# Patient Record
Sex: MALE | Race: WHITE | NOT HISPANIC OR LATINO | URBAN - METROPOLITAN AREA
[De-identification: names, ages, dates, MRNs, and addresses within clinical notes are randomized per-mention and may not be internally consistent; named-entity substitution may affect disease eponyms.]

---

## 2017-03-21 ENCOUNTER — INPATIENT (INPATIENT)
Facility: HOSPITAL | Age: 82
LOS: 7 days | Discharge: HOME | DRG: 640 | End: 2017-03-29
Attending: INTERNAL MEDICINE | Admitting: INTERNAL MEDICINE
Payer: MEDICARE

## 2017-03-21 VITALS — DIASTOLIC BLOOD PRESSURE: 58 MMHG | HEIGHT: 67 IN | WEIGHT: 160.06 LBS | SYSTOLIC BLOOD PRESSURE: 89 MMHG

## 2017-03-21 DIAGNOSIS — R62.7 ADULT FAILURE TO THRIVE: ICD-10-CM

## 2017-03-21 DIAGNOSIS — I48.91 UNSPECIFIED ATRIAL FIBRILLATION: ICD-10-CM

## 2017-03-21 DIAGNOSIS — E03.9 HYPOTHYROIDISM, UNSPECIFIED: ICD-10-CM

## 2017-03-21 DIAGNOSIS — N17.9 ACUTE KIDNEY FAILURE, UNSPECIFIED: ICD-10-CM

## 2017-03-21 LAB
CK SERPL-CCNC: 68 U/L — SIGNIFICANT CHANGE UP (ref 35–232)
LDH SERPL L TO P-CCNC: 180 U/L — SIGNIFICANT CHANGE UP (ref 120–225)
PHOSPHATE SERPL-MCNC: 5.7 MG/DL — HIGH (ref 2.5–4.5)
URATE SERPL-MCNC: 10.8 MG/DL — HIGH (ref 3.4–8.8)

## 2017-03-21 PROCEDURE — 99223 1ST HOSP IP/OBS HIGH 75: CPT | Mod: AI,GC

## 2017-03-21 PROCEDURE — 99285 EMERGENCY DEPT VISIT HI MDM: CPT

## 2017-03-21 RX ORDER — SODIUM CHLORIDE 9 MG/ML
1000 INJECTION, SOLUTION INTRAVENOUS
Qty: 0 | Refills: 0 | Status: DISCONTINUED | OUTPATIENT
Start: 2017-03-21 | End: 2017-03-22

## 2017-03-21 RX ORDER — LEVOTHYROXINE SODIUM 125 MCG
75 TABLET ORAL DAILY
Qty: 0 | Refills: 0 | Status: DISCONTINUED | OUTPATIENT
Start: 2017-03-21 | End: 2017-03-29

## 2017-03-21 RX ORDER — SODIUM CHLORIDE 9 MG/ML
1000 INJECTION, SOLUTION INTRAVENOUS
Qty: 0 | Refills: 0 | Status: DISCONTINUED | OUTPATIENT
Start: 2017-03-21 | End: 2017-03-21

## 2017-03-21 RX ORDER — ALLOPURINOL 300 MG
100 TABLET ORAL DAILY
Qty: 0 | Refills: 0 | Status: DISCONTINUED | OUTPATIENT
Start: 2017-03-21 | End: 2017-03-29

## 2017-03-21 RX ORDER — SODIUM CHLORIDE 9 MG/ML
1000 INJECTION INTRAMUSCULAR; INTRAVENOUS; SUBCUTANEOUS ONCE
Qty: 0 | Refills: 0 | Status: COMPLETED | OUTPATIENT
Start: 2017-03-21 | End: 2017-03-21

## 2017-03-21 RX ORDER — ENOXAPARIN SODIUM 100 MG/ML
70 INJECTION SUBCUTANEOUS DAILY
Qty: 0 | Refills: 0 | Status: DISCONTINUED | OUTPATIENT
Start: 2017-03-22 | End: 2017-03-28

## 2017-03-21 RX ORDER — APIXABAN 2.5 MG/1
2.5 TABLET, FILM COATED ORAL
Qty: 0 | Refills: 0 | Status: DISCONTINUED | OUTPATIENT
Start: 2017-03-21 | End: 2017-03-21

## 2017-03-21 RX ORDER — SODIUM CHLORIDE 9 MG/ML
1000 INJECTION INTRAMUSCULAR; INTRAVENOUS; SUBCUTANEOUS
Qty: 0 | Refills: 0 | Status: DISCONTINUED | OUTPATIENT
Start: 2017-03-21 | End: 2017-03-21

## 2017-03-21 RX ORDER — CEFTRIAXONE 500 MG/1
INJECTION, POWDER, FOR SOLUTION INTRAMUSCULAR; INTRAVENOUS
Qty: 0 | Refills: 0 | Status: DISCONTINUED | OUTPATIENT
Start: 2017-03-21 | End: 2017-03-27

## 2017-03-21 RX ORDER — GABAPENTIN 400 MG/1
0 CAPSULE ORAL
Qty: 0 | Refills: 0 | COMMUNITY

## 2017-03-21 RX ORDER — ATORVASTATIN CALCIUM 80 MG/1
20 TABLET, FILM COATED ORAL AT BEDTIME
Qty: 0 | Refills: 0 | Status: DISCONTINUED | OUTPATIENT
Start: 2017-03-21 | End: 2017-03-21

## 2017-03-21 RX ORDER — CEFTRIAXONE 500 MG/1
1 INJECTION, POWDER, FOR SOLUTION INTRAMUSCULAR; INTRAVENOUS EVERY 24 HOURS
Qty: 0 | Refills: 0 | Status: DISCONTINUED | OUTPATIENT
Start: 2017-03-22 | End: 2017-03-27

## 2017-03-21 RX ORDER — CEFTRIAXONE 500 MG/1
1 INJECTION, POWDER, FOR SOLUTION INTRAMUSCULAR; INTRAVENOUS ONCE
Qty: 0 | Refills: 0 | Status: COMPLETED | OUTPATIENT
Start: 2017-03-21 | End: 2017-03-21

## 2017-03-21 RX ADMIN — SODIUM CHLORIDE 100 MILLILITER(S): 9 INJECTION INTRAMUSCULAR; INTRAVENOUS; SUBCUTANEOUS at 19:10

## 2017-03-21 RX ADMIN — SODIUM CHLORIDE 250 MILLILITER(S): 9 INJECTION INTRAMUSCULAR; INTRAVENOUS; SUBCUTANEOUS at 15:25

## 2017-03-21 RX ADMIN — SODIUM CHLORIDE 75 MILLILITER(S): 9 INJECTION, SOLUTION INTRAVENOUS at 17:00

## 2017-03-21 RX ADMIN — SODIUM CHLORIDE 1000 MILLILITER(S): 9 INJECTION INTRAMUSCULAR; INTRAVENOUS; SUBCUTANEOUS at 17:30

## 2017-03-21 RX ADMIN — SODIUM CHLORIDE 70 MILLILITER(S): 9 INJECTION, SOLUTION INTRAVENOUS at 23:35

## 2017-03-21 RX ADMIN — SODIUM CHLORIDE 1000 MILLILITER(S): 9 INJECTION INTRAMUSCULAR; INTRAVENOUS; SUBCUTANEOUS at 13:44

## 2017-03-21 RX ADMIN — SODIUM CHLORIDE 100 MILLILITER(S): 9 INJECTION, SOLUTION INTRAVENOUS at 23:47

## 2017-03-21 NOTE — H&P ADULT. - NEGATIVE NEUROLOGICAL SYMPTOMS
no vertigo/no loss of sensation/no generalized seizures/no syncope/no tremors/no transient paralysis/no paresthesias/no focal seizures

## 2017-03-21 NOTE — H&P ADULT. - PROBLEM SELECTOR PLAN 1
Pt is not eating and drinking and became non verbal, low albumin 2.5 likely due to Failure to thrive and AMS from dehydration.  Pt has leukocytosis 39.6 with lymphocyte predominance with smudge cells, rouleaux formation, and giant platelets on peripheral smear concerning for Chronic Lymphocytic Leukemia or other Malignancy which could also lead to FTT.  S/P ns 2 LT BOLUS, will give D5W and 0.45% NS 2 60 as hypernatremia 156.  f/u speech and swallow eval  f/u Ca 19-9, CEA and Anemia panel Pt is not eating and drinking and became non verbal, low albumin 2.5 likely due to Failure to thrive and AMS from dehydration.  Pt has leukocytosis 39.6 with lymphocyte predominance with smudge cells, rouleaux formation, and giant platelets on peripheral smear concerning for Chronic Lymphocytic Leukemia or other Malignancy which could also lead to FTT.  Pt has acidosis with bicarb 18, likely metabolic acidosis from dehydration, will f/u lactate and ABG  S/P ns 2 LT BOLUS, will give D5W and 0.45% NS 2 60 as hypernatremia 156.  f/u speech and swallow eval  f/u Ca 19-9, CEA and Anemia panel Pt is not eating and drinking and became non verbal, low albumin 2.5 likely due to Failure to thrive and AMS from dehydration.  Pt has leukocytosis 39.6 with lymphocyte predominance with smudge cells, rouleaux formation, and giant platelets on peripheral smear concerning for Chronic Lymphocytic Leukemia  with concern for tumor lysis syndrome given NICOLE which could lead to FTT.  Pt has acidosis with bicarb 18, likely metabolic acidosis from dehydration, will f/u lactate and ABG  S/P ns 2 LT BOLUS, will give D5W and 0.45% NS 2 60 as hypernatremia 156.  f/u speech and swallow eval  f/u Ca 19-9, and Anemia panel  Hem onco Dr. Schuster consulted

## 2017-03-21 NOTE — H&P ADULT. - PROBLEM SELECTOR PLAN 4
continue eliquis, rate controlled held eliquis as pt eGFR is 15, will give renal adjusted Lovenox 70mg daily.

## 2017-03-21 NOTE — H&P ADULT. - PROBLEM SELECTOR PLAN 2
Pt has NICOLE with creatinine 3.13, likely prerenal or post renal due to dehydration or obstruction.  f/u urine lytes  f/u CT abd/ pelvis to r/o obstruction and malignancy  monitor bmp Pt has NICOLE with creatinine 3.13, likely prerenal or post renal due to dehydration or concern for tumor lysis syndrome from possible CLL.  f/u urine lytes  f/u CT abd/ pelvis to r/o  malignancy  monitor bmp  f/u uric acid and CK to look for tumor lysis  Called PMD Dr. Casper Wood [phone] and Nephro [phone] for baseline creatinine and any antibiotic prescribed for strep throat, but no answer. Primary team to call in AM again Pt has NICOLE with creatinine 3.13, unknown baseline, no known CKD likely prerenal or post renal due to dehydration or concern for tumor lysis syndrome from possible CLL.  f/u urine lytes  f/u CT abd/ pelvis to r/o  malignancy  monitor bmp  f/u uric acid and CK to look for tumor lysis  Called PMD Dr. Casper Wood [phone] and Nephro [phone] for baseline creatinine and any antibiotic prescribed for strep throat, but no answer. Primary team to call in AM again

## 2017-03-21 NOTE — H&P ADULT. - RS GEN PE MLT RESP DETAILS PC
no rhonchi/no rales/no wheezes/no chest wall tenderness/breath sounds equal/clear to auscultation bilaterally/normal

## 2017-03-21 NOTE — H&P ADULT. - NEUROLOGICAL DETAILS
normal strength/sensation intact/cranial nerves intact/ALERT AND AWAKE, pt not follwing commands and not  answering questions

## 2017-03-21 NOTE — H&P ADULT. - ATTENDING COMMENTS
Patient seen and examined after d/w PGY2 MAR Dr. Arcos    89 y/o male from home, walks with walker at baseline, lives with wife and has HHA, with PMHx of Aortic Valve Replacement (Surgery done 9 months ago) and Hypothyroidism, ?Afib as on eliquis, PSH of Rt knee replacement, presents to the ED BIB son for failure to thrive. As per son, for the past 2 weeks pt has stopped eating, taking his medications, spitting out water, refusing food, not walking, and has become nonverbal, was AAOX3 and verbal at baseline. Pt is currently on Eliquis and Gabapentin. ROS limited given that pt is nonverbal. Son denies recent falls, fevers, and chills, SOB, CP, Abd pain, dysuria, n/v/d.  In ED pt has WBC of 39.6 with lymphocyte predominance, bp in 90s, sodium 156, bicarb 18.    At present HHA at bedside; Patient is drowsy and appears very dry but arousable, non verbal;     P/E:: Limited exam; as per PGY2    Labs: reviewed; Na !56, CO2 18, Cr 3.13  WBC 39,000  CXR: No active chest disease    D/D:  Acute Hypernatremia with Acute Kidney injury likely due to Chronic Lymphocytic leukemia with dehydration  Likely Tumor lysis syndrome    Plan:  Admit to medicine; IV Boluses; Maintenance IV fluid Normal saline for 24 hrs  Repeat BMP; Monitor Na closely.  Oncology OT eval complete. Dr. Randall Schuster; Agrees with supportive care, hydration and starting Allopurinol  Discontinue Apixaban due to GFR less than 30; Start Lovenox therapeutic once daily tomorrow adjusted for Ce Cl.  Called contact info home granddaughter picked up not aware of pt hospitalization; gave Nagi grandson number [phone]  Needs to address Goals of care; PGY 2 MAR d/w son who is going to discuss with family. DNR would be appropriate  Monitor BMP closely Patient seen and examined after d/w PGY2 MAR Dr. Arcos    89 y/o male from home, walks with walker at baseline, lives with wife and has HHA, with PMHx of Aortic Valve Replacement (Surgery done 9 months ago) and Hypothyroidism, ?Afib as on eliquis, PSH of Rt knee replacement, presents to the ED BIB son for failure to thrive. As per son, for the past 2 weeks pt has stopped eating, taking his medications, spitting out water, refusing food, not walking, and has become nonverbal, was AAOX3 and verbal at baseline. Pt is currently on Eliquis and Gabapentin. ROS limited given that pt is nonverbal. Son denies recent falls, fevers, and chills, SOB, CP, Abd pain, dysuria, n/v/d.  In ED pt has WBC of 39.6 with lymphocyte predominance, bp in 90s, sodium 156, bicarb 18.    At present HHA at bedside; Patient is drowsy and appears very dry but arousable, non verbal;     P/E:: Limited exam; as per PGY2    Labs: reviewed; Na !56, CO2 18, Cr 3.13  WBC 39,000  CXR: No active chest disease    D/D:  Acute Hypernatremia with Acute Kidney injury likely due to Chronic Lymphocytic leukemia with dehydration  Likely Tumor lysis syndrome    Plan:  Admit to medicine; IV Boluses; Maintenance IV fluid Normal saline for 24 hrs  Repeat BMP; Monitor Na closely.  Oncology OT eval complete. Dr. Randall Schuster; Agrees with supportive care, hydration and starting Allopurinol  d/w Renal consult Dr. solis/ Dr. Marquez recommend one dose Rasburicase   Discontinue Apixaban due to GFR less than 30; Start Lovenox therapeutic once daily tomorrow adjusted for Ce Cl.  Called contact info home granddaughter picked up not aware of pt hospitalization; gave Nagi grandson number [phone]  Needs to address Goals of care; PGY 2 MAR d/w son who is going to discuss with family. DNR would be appropriate  Monitor BMP closely Patient seen and examined after d/w PGY2 MAR Dr. Arcos    89 y/o male from home, walks with walker at baseline, lives with wife and has HHA, with PMHx of Aortic Valve Replacement (Surgery done 9 months ago) and Hypothyroidism, ?Afib as on eliquis, PSH of Rt knee replacement, presents to the ED BIB son for failure to thrive. As per son, for the past 2 weeks pt has stopped eating, taking his medications, spitting out water, refusing food, not walking, and has become nonverbal, was AAOX3 and verbal at baseline. Pt is currently on Eliquis and Gabapentin. ROS limited given that pt is nonverbal. Son denies recent falls, fevers, and chills, SOB, CP, Abd pain, dysuria, n/v/d.  In ED pt has WBC of 39.6 with lymphocyte predominance, bp in 90s, sodium 156, bicarb 18.    At present HHA at bedside; Patient is drowsy and appears very dry but arousable, non verbal;     P/E:: Limited exam; as per PGY2    Labs: reviewed; Na !56, CO2 18, Cr 3.13  WBC 39,000  CXR: No active chest disease    D/D:  Acute Hypernatremia with Acute Kidney injury likely due to Chronic Lymphocytic leukemia with dehydration  Likely Tumor lysis syndrome    Plan:  Admit to medicine; IV Boluses; Maintenance IV fluid Normal saline for 24 hrs then change to D5 water  Repeat BMP; Monitor Na closely.  Oncology OT eval complete. Dr. Randall Schuster; Agrees with supportive care, hydration and starting Allopurinol  d/w Renal consult Dr. solis/ Dr. Marquez recommend one dose Rasburicase   Discontinue Apixaban due to GFR less than 30; Start Lovenox therapeutic once daily tomorrow adjusted for Ce Cl.  Called contact info home granddaughter picked up not aware of pt hospitalization; gave Nagi grandson number [phone]  Needs to address Goals of care; PGY 2 MAR d/w son who is going to discuss with family. DNR would be appropriate  Monitor BMP closely Patient seen and examined after d/w PGY2 MAR Dr. Arcos    89 y/o male from home, walks with walker at baseline, lives with wife and has HHA, with PMHx of Aortic Valve Replacement (Surgery done 9 months ago) and Hypothyroidism, ?Afib as on eliquis, PSH of Rt knee replacement, presents to the ED BIB son for failure to thrive. As per son, for the past 2 weeks pt has stopped eating, taking his medications, spitting out water, refusing food, not walking, and has become nonverbal, was AAOX3 and verbal at baseline. Pt is currently on Eliquis and Gabapentin. ROS limited given that pt is nonverbal. Son denies recent falls, fevers, and chills, SOB, CP, Abd pain, dysuria, n/v/d.  In ED pt has WBC of 39.6 with lymphocyte predominance, bp in 90s, sodium 156, bicarb 18.    At present HHA at bedside; Patient is drowsy and appears very dry but arousable, non verbal;     P/E:: Limited exam; as per PGY2    Labs: reviewed; Na !56, CO2 18, Cr 3.13  WBC 39,000  CXR: No active chest disease    D/D:  Acute Hypernatremia with Acute Kidney injury likely due to Chronic Lymphocytic leukemia with dehydration  Likely Tumor lysis syndrome    Plan:  Admit to medicine; IV Boluses; Maintenance IV fluid Normal saline for 24 hrs then change to D5 water  Repeat BMP; Monitor Na closely.  Oncology OT eval complete. Dr. Randall Schuster; Agrees with supportive care, hydration and starting Allopurinol  d/w Renal consult Dr. Rodrigues/ Dr. Marquez recommend Rasburicase if Allopurinol does not help  Discontinue Apixaban due to GFR less than 30; Start Lovenox therapeutic once daily tomorrow adjusted for Ce Cl.  Called contact info home granddaughter picked up not aware of pt hospitalization; gave Nagi grandson number [phone]  Needs to address Goals of care; PGY 2 MAR d/w son who is going to discuss with family. DNR would be appropriate  Monitor BMP closely; d/w PGY2 MAR and PGY3 on call Dr. Gonzalez; CK levels in AM  If renal functions does not improve will consider Renal sonogram

## 2017-03-21 NOTE — H&P ADULT. - NEGATIVE CARDIOVASCULAR SYMPTOMS
no paroxysmal nocturnal dyspnea/no dyspnea on exertion/no palpitations/no chest pain/no peripheral edema

## 2017-03-21 NOTE — ED PROVIDER NOTE - MEDICAL DECISION MAKING DETAILS
Pt with failure to thrive and dehydration x 2 weeks. Will check labs to r/o sepsis, give IVF, and get CT head to r/o ICH or any acute pathologies. Pt with failure to thrive and dehydration x 2 weeks. Will check labs to r/o sepsis, electrolyte abnormality. Will give IVF, and get CT head to r/o ICH or any acute pathology, will plan to admit given patient has change in activities of daily living.

## 2017-03-21 NOTE — H&P ADULT. - ASSESSMENT
89 y/o male from home, walks with walker at baseline, lives with wife and has HHA, with PMHx of Aortic Valve Replacement (Surgery done 9 months ago) and Hypothyroidism, ?Afib as on eliquis, PSH of Rt knee replacement, presents to the ED BIB son for failure to thrive. As per son, for the past 2 weeks pt has stopped eating, taking his medications, spitting out water, refusing food, not walking, and has become nonverbal, was AAOX3 and verbal at baseline. Pt is currently on Eliquis and Gabapentin. ROS limited given that pt is nonverbal. Son denies recent falls, fevers, and chills, SOB, CP, Abd pain, dysuria, n/v/d.  In ED pt has WBC of 39.6 with lymphocyte predominance, bp in 90s, sodium 156, bicarb 18.  Pt s son thinks he should be comfortable but wants to discuss with pt s wife and daughter before making a decision about goals of care. 89 y/o male from home, walks with walker at baseline, lives with wife and has HHA, with PMHx of Aortic Valve Replacement (Surgery done 9 months ago) and Hypothyroidism, ?Afib as on eliquis, PSH of Rt knee replacement, presents to the ED BIB son for failure to thrive. As per son, for the past 2 weeks pt has stopped eating, taking his medications, spitting out water, refusing food, not walking, and has become nonverbal, was AAOX3 and verbal at baseline. Pt is currently on Eliquis and Gabapentin. ROS limited given that pt is nonverbal. Son denies recent falls, fevers, and chills, SOB, CP, Abd pain, dysuria, n/v/d.  In ED pt has WBC of 39.6 with lymphocyte predominance, bp in 90s, sodium 156, bicarb 18.  Pt s son thinks he should be comfortable but wants to discuss with pt s wife and daughter before making a decision about goals of care.  Called PMD Dr. Casper Wood [phone] and Nephro [phone] for baseline creatinine and any antibiotic prescribed for strep throat, but no answer. Primary team to call in AM again. 89 y/o male from home, walks with walker at baseline, lives with wife and has HHA, with PMHx of Aortic Valve Replacement (Surgery done 9 months ago) and Hypothyroidism, ?Afib as on eliquis, PSH of Rt knee replacement, presents to the ED BIB son for failure to thrive. As per son, for the past 2 weeks pt has stopped eating, taking his medications, spitting out water, refusing food, not walking, and has become nonverbal, was AAOX3 and verbal at baseline. Pt is currently on Eliquis and Gabapentin. ROS limited given that pt is nonverbal. Son denies recent falls, fevers, and chills, SOB, CP, Abd pain, dysuria, n/v/d.  In ED pt has WBC of 39.6 with lymphocyte predominance, bp in 90s, sodium 156, bicarb 18.  Pt s son thinks he should be comfortable but wants to discuss with pt s wife and daughter before making a decision about goals of care.

## 2017-03-21 NOTE — ED PROVIDER NOTE - NS ED MD SCRIBE ATTENDING SCRIBE SECTIONS
PAST MEDICAL/SURGICAL/SOCIAL HISTORY/VITAL SIGNS( Pullset)/DISPOSITION/HISTORY OF PRESENT ILLNESS/HIV/PHYSICAL EXAM/REVIEW OF SYSTEMS

## 2017-03-21 NOTE — ED PROVIDER NOTE - OBJECTIVE STATEMENT
89 y/o male with PMHx of Aortic Valve Replacement (Surgery done 9 months ago) and Hypothyroidism presents to the ED BIB son for failure to thrive. As per son and aide, for the past 2 weeks pt has stopped eating, taking his medications, spitting out water, refusing food, not walking, and has become nonverbal. Pt is currently on Eliquis and Gabapentin. ROS limited given that pt is nonverbal. Son denies recent falls, fevers, and chills. PMD: Dr. Luis.

## 2017-03-21 NOTE — ED PROVIDER NOTE - CARE PLAN
Principal Discharge DX:	Failure to thrive in adult Principal Discharge DX:	Failure to thrive in adult  Secondary Diagnosis:	Acute renal failure, unspecified acute renal failure type

## 2017-03-21 NOTE — H&P ADULT. - HISTORY OF PRESENT ILLNESS
89 y/o male from home, walks with walker at baseline, lives with wife and has HHA, with PMHx of Aortic Valve Replacement (Surgery done 9 months ago) and Hypothyroidism, ?Afib as on eliquis, PSH of Rt knee replacement, presents to the ED BIB son for failure to thrive. As per son, for the past 2 weeks pt has stopped eating, taking his medications, spitting out water, refusing food, not walking, and has become nonverbal, was AAOX3 and verbal at baseline. Pt is currently on Eliquis and Gabapentin. ROS limited given that pt is nonverbal. Son denies recent falls, fevers, and chills, SOB, CP, Abd pain, dysuria, n/v/d.  In ED pt has WBC of 39.6 with lymphocyte predominance, bp in 90s, sodium 156, bicarb 18.  Pt s son thinks he should be comfortable but wants to discuss with pt s wife and daughter before making a decision about goals of care. 89 y/o male from home, walks with walker at baseline, lives with wife and has HHA, with PMHx of Aortic Valve Replacement (Surgery done 9 months ago) and Hypothyroidism, ?Afib as on eliquis, PSH of Rt knee replacement, presents to the ED BIB son for failure to thrive. As per son, for the past 2 weeks pt has stopped eating, taking his medications, spitting out water, refusing food, not walking, and has become nonverbal, was AAOX3 and verbal at baseline. Pt is currently on Eliquis and Gabapentin. ROS limited given that pt is nonverbal. Son denies recent falls, fevers, and chills, SOB, CP, Abd pain, dysuria, n/v/d.  In ED pt has WBC of 39.6 with lymphocyte predominance, bp in 90s, sodium 156, bicarb 18.  Pt s son thinks he should be comfortable but wants to discuss with pt s wife and daughter before making a decision about goals of care.  Called PMD Dr. Casper Wood [phone] and Nephro [phone] for baseline creatinine and any antibiotic prescribed for strep throat, but no answer. Primary team to call in AM again.

## 2017-03-21 NOTE — ED ADULT NURSE NOTE - OBJECTIVE STATEMENT
Patient came to the ED sent from home for failure to thrive. Patient is not eating or drinking properly for 2 weeks.

## 2017-03-22 PROCEDURE — 71010: CPT | Mod: 26

## 2017-03-22 PROCEDURE — 99232 SBSQ HOSP IP/OBS MODERATE 35: CPT | Mod: GC

## 2017-03-22 RX ORDER — SODIUM CHLORIDE 9 MG/ML
1000 INJECTION, SOLUTION INTRAVENOUS
Qty: 0 | Refills: 0 | Status: DISCONTINUED | OUTPATIENT
Start: 2017-03-22 | End: 2017-03-29

## 2017-03-22 RX ADMIN — ENOXAPARIN SODIUM 70 MILLIGRAM(S): 100 INJECTION SUBCUTANEOUS at 13:45

## 2017-03-22 RX ADMIN — CEFTRIAXONE 100 GRAM(S): 500 INJECTION, POWDER, FOR SOLUTION INTRAMUSCULAR; INTRAVENOUS at 00:55

## 2017-03-22 RX ADMIN — CEFTRIAXONE 100 GRAM(S): 500 INJECTION, POWDER, FOR SOLUTION INTRAMUSCULAR; INTRAVENOUS at 23:49

## 2017-03-22 RX ADMIN — Medication 75 MICROGRAM(S): at 07:02

## 2017-03-22 RX ADMIN — SODIUM CHLORIDE 70 MILLILITER(S): 9 INJECTION, SOLUTION INTRAVENOUS at 23:50

## 2017-03-22 NOTE — DIETITIAN INITIAL EVALUATION ADULT. - MD RECOMMEND
po supplement/Advance diet with nutrition supplement pending Speech & Swallow evaluation, as medically feasible

## 2017-03-22 NOTE — DIETITIAN INITIAL EVALUATION ADULT. - NUTRITION INTERVENTION
Meals and Snack/Feeding Assistance/Collaboration and Referral of Nutrition Care/Medical Food Supplements

## 2017-03-22 NOTE — DIETITIAN INITIAL EVALUATION ADULT. - OTHER INFO
Nutrition consult requested for failure to thrive. Patient from home lives with wife & has HHA. Pt. visited, alert but confused, poor historian, tried contacting son but no response, per H&P/son reports pt. 2xwks stopped eating, spitting water & refusing to eat. Also unable to obtain wt. hx. Per RN pt. NPO presently pending Speech & Swallow evaluation & awaiting to advance diet pending decision, Renal consult noted, skin intact.

## 2017-03-22 NOTE — SWALLOW BEDSIDE ASSESSMENT ADULT - COMMENTS
HOB elevated to 45 degrees. Pt awake, evidenced by moving of arms, but eyes remained closed for the entirety of eval. Nonverbal and unresponsive to sternal rubs, verbal cueing, or repositioning. Extensive tactile cueing ineffective in yielding PO acceptance; exam was subsequently terminated due to pt's oral defensiveness and refusal to take PO trials.

## 2017-03-23 LAB
ANION GAP SERPL CALC-SCNC: 9 MMOL/L — SIGNIFICANT CHANGE UP (ref 5–17)
BUN SERPL-MCNC: 51 MG/DL — HIGH (ref 7–18)
CALCIUM SERPL-MCNC: 8.2 MG/DL — LOW (ref 8.4–10.5)
CHLORIDE SERPL-SCNC: 124 MMOL/L — HIGH (ref 96–108)
CO2 SERPL-SCNC: 20 MMOL/L — LOW (ref 22–31)
CREAT SERPL-MCNC: 1.94 MG/DL — HIGH (ref 0.5–1.3)
GLUCOSE SERPL-MCNC: 120 MG/DL — HIGH (ref 70–99)
POTASSIUM SERPL-MCNC: 4.1 MMOL/L — SIGNIFICANT CHANGE UP (ref 3.5–5.3)
POTASSIUM SERPL-SCNC: 4.1 MMOL/L — SIGNIFICANT CHANGE UP (ref 3.5–5.3)
SODIUM SERPL-SCNC: 153 MMOL/L — HIGH (ref 135–145)
URATE SERPL-MCNC: 9.5 MG/DL — HIGH (ref 3.4–8.8)

## 2017-03-23 PROCEDURE — 99232 SBSQ HOSP IP/OBS MODERATE 35: CPT | Mod: GC

## 2017-03-23 RX ORDER — SENNA PLUS 8.6 MG/1
2 TABLET ORAL AT BEDTIME
Qty: 0 | Refills: 0 | Status: DISCONTINUED | OUTPATIENT
Start: 2017-03-23 | End: 2017-03-29

## 2017-03-23 RX ORDER — DOCUSATE SODIUM 100 MG
100 CAPSULE ORAL DAILY
Qty: 0 | Refills: 0 | Status: DISCONTINUED | OUTPATIENT
Start: 2017-03-23 | End: 2017-03-27

## 2017-03-23 RX ORDER — POLYETHYLENE GLYCOL 3350 17 G/17G
17 POWDER, FOR SOLUTION ORAL DAILY
Qty: 0 | Refills: 0 | Status: COMPLETED | OUTPATIENT
Start: 2017-03-23 | End: 2017-03-26

## 2017-03-23 RX ADMIN — SODIUM CHLORIDE 70 MILLILITER(S): 9 INJECTION, SOLUTION INTRAVENOUS at 08:26

## 2017-03-23 RX ADMIN — SODIUM CHLORIDE 70 MILLILITER(S): 9 INJECTION, SOLUTION INTRAVENOUS at 06:34

## 2017-03-23 RX ADMIN — ENOXAPARIN SODIUM 70 MILLIGRAM(S): 100 INJECTION SUBCUTANEOUS at 17:27

## 2017-03-23 RX ADMIN — Medication 100 MILLIGRAM(S): at 17:28

## 2017-03-23 NOTE — SWALLOW BEDSIDE ASSESSMENT ADULT - ASR SWALLOW LABIAL MOBILITY
unable to assess due to poor pt participation
small oral aperture, tight pucker/impaired coordination

## 2017-03-23 NOTE — SWALLOW BEDSIDE ASSESSMENT ADULT - COMMENTS
Pt seen for bedside swallow evaluation, Alert & coopertive, nonverbal, family present (1 whom spoke some English). HOB elevated to 90 degrees. Attempted to obtain Tajik phone , but waited unsuccessfully >10 mins. Notably, during exam, Pt coughed up 2 whole grapes, which were fed to him by family members prior to this exam. Family was educated at bedside on aspiration risks. No overt s/s aspiration on trials.

## 2017-03-23 NOTE — SWALLOW BEDSIDE ASSESSMENT ADULT - SWALLOW EVAL: RECOMMENDED FEEDING/EATING TECHNIQUES
maintain upright posture during/after eating for 30 mins/oral hygiene/position upright (90 degrees)
allow for swallow between intakes/position upright (90 degrees)/maintain upright posture during/after eating for 30 mins/check mouth frequently for oral residue/pocketing/crush medication (when feasible)/alternate food with liquid/oral hygiene/small sips/bites

## 2017-03-23 NOTE — SWALLOW BEDSIDE ASSESSMENT ADULT - ASR SWALLOW REFERRAL
High risk for malnutrition; suspected psychogenic dysphagia/Registered Dietitian/psychology
High risk for malnutrition; suspected psychogenic dysphagia/Registered Dietitian

## 2017-03-23 NOTE — SWALLOW BEDSIDE ASSESSMENT ADULT - SWALLOW EVAL: RECOMMENDED DIET
Will defer PO diet to medical team at this time; unable to make diet recommendations at this time 2/2 pt refusal to take sufficient PO trials for swallow evaluation.
Puree with thin liquids

## 2017-03-23 NOTE — SWALLOW BEDSIDE ASSESSMENT ADULT - SLP PERTINENT HISTORY OF CURRENT PROBLEM
89 y/o male from home, with PMHx of Aortic Valve Replacement s/p 9 months, Hypothyroidism, ?Afib, PSH of Rt knee replacement, brought in originally for failure to thrive.
89 y/o male from home, walks with walker at baseline, lives with wife and has HHA, with PMHx of Aortic Valve Replacement s/p 9 months, Hypothyroidism, ?Afib, PSH of Rt knee replacement, presented to the ED BIB son for failure to thrive. As per son in ED, pt has stopped eating & taking medications, spits out water, refuses food, stopped walking, and has become nonverbal. Son states that pt was AAOX3 and verbal at baseline.

## 2017-03-23 NOTE — SWALLOW BEDSIDE ASSESSMENT ADULT - SWALLOW EVAL: DIAGNOSIS
Pt p/w with suspected psychogenic dysphagia, c/b oral defensiveness, refusal to accept PO trials, and agitation (clenching lips, turning head away) in response to PO presentation.
Pt p/w s&s oropharyngeal dysphagia, impaired bolus formation, slow oral transit, and delayed swallow reflex. No overt s/s aspiration on trials.

## 2017-03-24 LAB
ALBUMIN SERPL ELPH-MCNC: 2.3 G/DL — LOW (ref 3.5–5)
ALP SERPL-CCNC: 107 U/L — SIGNIFICANT CHANGE UP (ref 40–120)
ALT FLD-CCNC: 30 U/L DA — SIGNIFICANT CHANGE UP (ref 10–60)
ANION GAP SERPL CALC-SCNC: 8 MMOL/L — SIGNIFICANT CHANGE UP (ref 5–17)
ANION GAP SERPL CALC-SCNC: 9 MMOL/L — SIGNIFICANT CHANGE UP (ref 5–17)
AST SERPL-CCNC: 24 U/L — SIGNIFICANT CHANGE UP (ref 10–40)
BILIRUB SERPL-MCNC: 0.5 MG/DL — SIGNIFICANT CHANGE UP (ref 0.2–1.2)
BUN SERPL-MCNC: 45 MG/DL — HIGH (ref 7–18)
BUN SERPL-MCNC: 45 MG/DL — HIGH (ref 7–18)
C3 SERPL-MCNC: 107 MG/DL — SIGNIFICANT CHANGE UP (ref 80–180)
C4 SERPL-MCNC: 45 MG/DL — SIGNIFICANT CHANGE UP (ref 10–45)
CALCIUM SERPL-MCNC: 8.3 MG/DL — LOW (ref 8.4–10.5)
CALCIUM SERPL-MCNC: 8.4 MG/DL — SIGNIFICANT CHANGE UP (ref 8.4–10.5)
CHLORIDE SERPL-SCNC: 120 MMOL/L — HIGH (ref 96–108)
CHLORIDE SERPL-SCNC: 123 MMOL/L — HIGH (ref 96–108)
CO2 SERPL-SCNC: 21 MMOL/L — LOW (ref 22–31)
CO2 SERPL-SCNC: 21 MMOL/L — LOW (ref 22–31)
CREAT SERPL-MCNC: 1.86 MG/DL — HIGH (ref 0.5–1.3)
CREAT SERPL-MCNC: 2.01 MG/DL — HIGH (ref 0.5–1.3)
GLUCOSE SERPL-MCNC: 111 MG/DL — HIGH (ref 70–99)
GLUCOSE SERPL-MCNC: 145 MG/DL — HIGH (ref 70–99)
HCT VFR BLD CALC: 32.9 % — LOW (ref 39–50)
HGB BLD-MCNC: 10.3 G/DL — LOW (ref 13–17)
MAGNESIUM SERPL-MCNC: 2.2 MG/DL — SIGNIFICANT CHANGE UP (ref 1.8–2.4)
MCHC RBC-ENTMCNC: 26.7 PG — LOW (ref 27–34)
MCHC RBC-ENTMCNC: 31.4 GM/DL — LOW (ref 32–36)
MCV RBC AUTO: 85.2 FL — SIGNIFICANT CHANGE UP (ref 80–100)
PHOSPHATE SERPL-MCNC: 2.8 MG/DL — SIGNIFICANT CHANGE UP (ref 2.5–4.5)
PLATELET # BLD AUTO: 265 K/UL — SIGNIFICANT CHANGE UP (ref 150–400)
POTASSIUM SERPL-MCNC: 4.1 MMOL/L — SIGNIFICANT CHANGE UP (ref 3.5–5.3)
POTASSIUM SERPL-MCNC: 4.3 MMOL/L — SIGNIFICANT CHANGE UP (ref 3.5–5.3)
POTASSIUM SERPL-SCNC: 4.1 MMOL/L — SIGNIFICANT CHANGE UP (ref 3.5–5.3)
POTASSIUM SERPL-SCNC: 4.3 MMOL/L — SIGNIFICANT CHANGE UP (ref 3.5–5.3)
PROT SERPL-MCNC: 6.8 G/DL — SIGNIFICANT CHANGE UP (ref 6–8.3)
RBC # BLD: 3.86 M/UL — LOW (ref 4.2–5.8)
RBC # FLD: 15.7 % — HIGH (ref 10.3–14.5)
SODIUM SERPL-SCNC: 150 MMOL/L — HIGH (ref 135–145)
SODIUM SERPL-SCNC: 152 MMOL/L — HIGH (ref 135–145)
URATE SERPL-MCNC: 8.4 MG/DL — SIGNIFICANT CHANGE UP (ref 3.4–8.8)
WBC # BLD: SIGNIFICANT CHANGE UP K/UL (ref 3.8–10.5)
WBC # FLD AUTO: SIGNIFICANT CHANGE UP K/UL (ref 3.8–10.5)

## 2017-03-24 PROCEDURE — 99232 SBSQ HOSP IP/OBS MODERATE 35: CPT | Mod: GC

## 2017-03-24 RX ADMIN — ENOXAPARIN SODIUM 70 MILLIGRAM(S): 100 INJECTION SUBCUTANEOUS at 13:17

## 2017-03-24 RX ADMIN — CEFTRIAXONE 100 GRAM(S): 500 INJECTION, POWDER, FOR SOLUTION INTRAMUSCULAR; INTRAVENOUS at 00:24

## 2017-03-24 RX ADMIN — Medication 100 MILLIGRAM(S): at 13:17

## 2017-03-24 RX ADMIN — POLYETHYLENE GLYCOL 3350 17 GRAM(S): 17 POWDER, FOR SOLUTION ORAL at 13:18

## 2017-03-24 RX ADMIN — Medication 75 MICROGRAM(S): at 05:16

## 2017-03-24 RX ADMIN — CEFTRIAXONE 100 GRAM(S): 500 INJECTION, POWDER, FOR SOLUTION INTRAMUSCULAR; INTRAVENOUS at 23:25

## 2017-03-24 RX ADMIN — SODIUM CHLORIDE 70 MILLILITER(S): 9 INJECTION, SOLUTION INTRAVENOUS at 00:26

## 2017-03-25 LAB
ANION GAP SERPL CALC-SCNC: 7 MMOL/L — SIGNIFICANT CHANGE UP (ref 5–17)
ANION GAP SERPL CALC-SCNC: 8 MMOL/L — SIGNIFICANT CHANGE UP (ref 5–17)
BUN SERPL-MCNC: 36 MG/DL — HIGH (ref 7–18)
BUN SERPL-MCNC: 39 MG/DL — HIGH (ref 7–18)
CALCIUM SERPL-MCNC: 8.1 MG/DL — LOW (ref 8.4–10.5)
CALCIUM SERPL-MCNC: 8.2 MG/DL — LOW (ref 8.4–10.5)
CHLORIDE SERPL-SCNC: 115 MMOL/L — HIGH (ref 96–108)
CHLORIDE SERPL-SCNC: 118 MMOL/L — HIGH (ref 96–108)
CO2 SERPL-SCNC: 22 MMOL/L — SIGNIFICANT CHANGE UP (ref 22–31)
CO2 SERPL-SCNC: 23 MMOL/L — SIGNIFICANT CHANGE UP (ref 22–31)
CREAT SERPL-MCNC: 1.82 MG/DL — HIGH (ref 0.5–1.3)
CREAT SERPL-MCNC: 1.91 MG/DL — HIGH (ref 0.5–1.3)
GLUCOSE SERPL-MCNC: 119 MG/DL — HIGH (ref 70–99)
GLUCOSE SERPL-MCNC: 127 MG/DL — HIGH (ref 70–99)
MAGNESIUM SERPL-MCNC: 2 MG/DL — SIGNIFICANT CHANGE UP (ref 1.8–2.4)
PHOSPHATE SERPL-MCNC: 2.5 MG/DL — SIGNIFICANT CHANGE UP (ref 2.5–4.5)
POTASSIUM SERPL-MCNC: 4.3 MMOL/L — SIGNIFICANT CHANGE UP (ref 3.5–5.3)
POTASSIUM SERPL-MCNC: 4.4 MMOL/L — SIGNIFICANT CHANGE UP (ref 3.5–5.3)
POTASSIUM SERPL-SCNC: 4.3 MMOL/L — SIGNIFICANT CHANGE UP (ref 3.5–5.3)
POTASSIUM SERPL-SCNC: 4.4 MMOL/L — SIGNIFICANT CHANGE UP (ref 3.5–5.3)
SODIUM SERPL-SCNC: 146 MMOL/L — HIGH (ref 135–145)
SODIUM SERPL-SCNC: 147 MMOL/L — HIGH (ref 135–145)

## 2017-03-25 RX ADMIN — CEFTRIAXONE 100 GRAM(S): 500 INJECTION, POWDER, FOR SOLUTION INTRAMUSCULAR; INTRAVENOUS at 23:07

## 2017-03-25 RX ADMIN — Medication 100 MILLIGRAM(S): at 11:54

## 2017-03-25 RX ADMIN — POLYETHYLENE GLYCOL 3350 17 GRAM(S): 17 POWDER, FOR SOLUTION ORAL at 11:54

## 2017-03-25 RX ADMIN — ENOXAPARIN SODIUM 70 MILLIGRAM(S): 100 INJECTION SUBCUTANEOUS at 11:54

## 2017-03-25 RX ADMIN — SODIUM CHLORIDE 70 MILLILITER(S): 9 INJECTION, SOLUTION INTRAVENOUS at 11:55

## 2017-03-25 RX ADMIN — SENNA PLUS 2 TABLET(S): 8.6 TABLET ORAL at 22:32

## 2017-03-25 RX ADMIN — SODIUM CHLORIDE 70 MILLILITER(S): 9 INJECTION, SOLUTION INTRAVENOUS at 05:22

## 2017-03-25 RX ADMIN — Medication 75 MICROGRAM(S): at 05:22

## 2017-03-25 RX ADMIN — SODIUM CHLORIDE 70 MILLILITER(S): 9 INJECTION, SOLUTION INTRAVENOUS at 22:30

## 2017-03-26 LAB
ANION GAP SERPL CALC-SCNC: 8 MMOL/L — SIGNIFICANT CHANGE UP (ref 5–17)
ANION GAP SERPL CALC-SCNC: 9 MMOL/L — SIGNIFICANT CHANGE UP (ref 5–17)
BUN SERPL-MCNC: 32 MG/DL — HIGH (ref 7–18)
BUN SERPL-MCNC: 33 MG/DL — HIGH (ref 7–18)
CALCIUM SERPL-MCNC: 7.9 MG/DL — LOW (ref 8.4–10.5)
CALCIUM SERPL-MCNC: 8 MG/DL — LOW (ref 8.4–10.5)
CHLORIDE SERPL-SCNC: 113 MMOL/L — HIGH (ref 96–108)
CHLORIDE SERPL-SCNC: 114 MMOL/L — HIGH (ref 96–108)
CO2 SERPL-SCNC: 21 MMOL/L — LOW (ref 22–31)
CO2 SERPL-SCNC: 22 MMOL/L — SIGNIFICANT CHANGE UP (ref 22–31)
CREAT SERPL-MCNC: 1.68 MG/DL — HIGH (ref 0.5–1.3)
CREAT SERPL-MCNC: 1.69 MG/DL — HIGH (ref 0.5–1.3)
GLUCOSE SERPL-MCNC: 117 MG/DL — HIGH (ref 70–99)
GLUCOSE SERPL-MCNC: 128 MG/DL — HIGH (ref 70–99)
HCT VFR BLD CALC: 31.2 % — LOW (ref 39–50)
HGB BLD-MCNC: 9.8 G/DL — LOW (ref 13–17)
MAGNESIUM SERPL-MCNC: 2 MG/DL — SIGNIFICANT CHANGE UP (ref 1.8–2.4)
MCHC RBC-ENTMCNC: 26.8 PG — LOW (ref 27–34)
MCHC RBC-ENTMCNC: 31.4 GM/DL — LOW (ref 32–36)
MCV RBC AUTO: 85.3 FL — SIGNIFICANT CHANGE UP (ref 80–100)
PHOSPHATE SERPL-MCNC: 2.5 MG/DL — SIGNIFICANT CHANGE UP (ref 2.5–4.5)
PLATELET # BLD AUTO: 206 K/UL — SIGNIFICANT CHANGE UP (ref 150–400)
POTASSIUM SERPL-MCNC: 4.1 MMOL/L — SIGNIFICANT CHANGE UP (ref 3.5–5.3)
POTASSIUM SERPL-MCNC: 4.2 MMOL/L — SIGNIFICANT CHANGE UP (ref 3.5–5.3)
POTASSIUM SERPL-SCNC: 4.1 MMOL/L — SIGNIFICANT CHANGE UP (ref 3.5–5.3)
POTASSIUM SERPL-SCNC: 4.2 MMOL/L — SIGNIFICANT CHANGE UP (ref 3.5–5.3)
RBC # BLD: 3.66 M/UL — LOW (ref 4.2–5.8)
RBC # FLD: 15.4 % — HIGH (ref 10.3–14.5)
SODIUM SERPL-SCNC: 143 MMOL/L — SIGNIFICANT CHANGE UP (ref 135–145)
SODIUM SERPL-SCNC: 144 MMOL/L — SIGNIFICANT CHANGE UP (ref 135–145)
WBC # BLD: 24.8 K/UL — HIGH (ref 3.8–10.5)
WBC # FLD AUTO: 24.8 K/UL — HIGH (ref 3.8–10.5)

## 2017-03-26 RX ADMIN — Medication 75 MICROGRAM(S): at 06:50

## 2017-03-26 RX ADMIN — ENOXAPARIN SODIUM 70 MILLIGRAM(S): 100 INJECTION SUBCUTANEOUS at 12:37

## 2017-03-26 RX ADMIN — CEFTRIAXONE 100 GRAM(S): 500 INJECTION, POWDER, FOR SOLUTION INTRAMUSCULAR; INTRAVENOUS at 22:56

## 2017-03-26 RX ADMIN — Medication 100 MILLIGRAM(S): at 12:37

## 2017-03-26 RX ADMIN — SENNA PLUS 2 TABLET(S): 8.6 TABLET ORAL at 22:55

## 2017-03-26 RX ADMIN — POLYETHYLENE GLYCOL 3350 17 GRAM(S): 17 POWDER, FOR SOLUTION ORAL at 12:37

## 2017-03-27 PROCEDURE — 99233 SBSQ HOSP IP/OBS HIGH 50: CPT | Mod: GC

## 2017-03-27 RX ORDER — DOCUSATE SODIUM 100 MG
100 CAPSULE ORAL
Qty: 0 | Refills: 0 | Status: DISCONTINUED | OUTPATIENT
Start: 2017-03-27 | End: 2017-03-29

## 2017-03-27 RX ADMIN — Medication 100 MILLIGRAM(S): at 12:15

## 2017-03-27 RX ADMIN — SODIUM CHLORIDE 70 MILLILITER(S): 9 INJECTION, SOLUTION INTRAVENOUS at 12:15

## 2017-03-27 RX ADMIN — ENOXAPARIN SODIUM 70 MILLIGRAM(S): 100 INJECTION SUBCUTANEOUS at 13:08

## 2017-03-27 RX ADMIN — Medication 100 MILLIGRAM(S): at 18:32

## 2017-03-28 LAB
ALBUMIN SERPL ELPH-MCNC: 2.2 G/DL — LOW (ref 3.5–5)
ALP SERPL-CCNC: 104 U/L — SIGNIFICANT CHANGE UP (ref 40–120)
ALT FLD-CCNC: 30 U/L DA — SIGNIFICANT CHANGE UP (ref 10–60)
ANION GAP SERPL CALC-SCNC: 7 MMOL/L — SIGNIFICANT CHANGE UP (ref 5–17)
AST SERPL-CCNC: 41 U/L — HIGH (ref 10–40)
BILIRUB SERPL-MCNC: 0.4 MG/DL — SIGNIFICANT CHANGE UP (ref 0.2–1.2)
BUN SERPL-MCNC: 25 MG/DL — HIGH (ref 7–18)
CALCIUM SERPL-MCNC: 8.3 MG/DL — LOW (ref 8.4–10.5)
CHLORIDE SERPL-SCNC: 113 MMOL/L — HIGH (ref 96–108)
CO2 SERPL-SCNC: 24 MMOL/L — SIGNIFICANT CHANGE UP (ref 22–31)
CREAT SERPL-MCNC: 1.71 MG/DL — HIGH (ref 0.5–1.3)
GLUCOSE SERPL-MCNC: 122 MG/DL — HIGH (ref 70–99)
MAGNESIUM SERPL-MCNC: 2.1 MG/DL — SIGNIFICANT CHANGE UP (ref 1.8–2.4)
PHOSPHATE SERPL-MCNC: 2.8 MG/DL — SIGNIFICANT CHANGE UP (ref 2.5–4.5)
POTASSIUM SERPL-MCNC: 4.8 MMOL/L — SIGNIFICANT CHANGE UP (ref 3.5–5.3)
POTASSIUM SERPL-SCNC: 4.8 MMOL/L — SIGNIFICANT CHANGE UP (ref 3.5–5.3)
PROT SERPL-MCNC: 6.3 G/DL — SIGNIFICANT CHANGE UP (ref 6–8.3)
SODIUM SERPL-SCNC: 144 MMOL/L — SIGNIFICANT CHANGE UP (ref 135–145)
URATE SERPL-MCNC: 5.8 MG/DL — SIGNIFICANT CHANGE UP (ref 3.4–8.8)

## 2017-03-28 PROCEDURE — 70450 CT HEAD/BRAIN W/O DYE: CPT | Mod: 26

## 2017-03-28 PROCEDURE — 99232 SBSQ HOSP IP/OBS MODERATE 35: CPT | Mod: GC

## 2017-03-28 RX ORDER — ALLOPURINOL 300 MG
1 TABLET ORAL
Qty: 30 | Refills: 0 | OUTPATIENT
Start: 2017-03-28 | End: 2017-04-27

## 2017-03-28 RX ORDER — DOCUSATE SODIUM 100 MG
1 CAPSULE ORAL
Qty: 60 | Refills: 0 | OUTPATIENT
Start: 2017-03-28 | End: 2017-04-27

## 2017-03-28 RX ORDER — LEVOTHYROXINE SODIUM 125 MCG
1 TABLET ORAL
Qty: 0 | Refills: 0 | COMMUNITY

## 2017-03-28 RX ORDER — ATORVASTATIN CALCIUM 80 MG/1
1 TABLET, FILM COATED ORAL
Qty: 0 | Refills: 0 | COMMUNITY

## 2017-03-28 RX ORDER — SENNA PLUS 8.6 MG/1
2 TABLET ORAL
Qty: 0 | Refills: 0 | COMMUNITY
Start: 2017-03-28

## 2017-03-28 RX ORDER — ASPIRIN/CALCIUM CARB/MAGNESIUM 324 MG
1 TABLET ORAL
Qty: 30 | Refills: 0 | OUTPATIENT
Start: 2017-03-28 | End: 2017-04-27

## 2017-03-28 RX ORDER — ATORVASTATIN CALCIUM 80 MG/1
1 TABLET, FILM COATED ORAL
Qty: 30 | Refills: 0 | OUTPATIENT
Start: 2017-03-28 | End: 2017-04-27

## 2017-03-28 RX ORDER — LEVOTHYROXINE SODIUM 125 MCG
1 TABLET ORAL
Qty: 30 | Refills: 0 | OUTPATIENT
Start: 2017-03-28 | End: 2017-04-27

## 2017-03-28 RX ORDER — ASPIRIN/CALCIUM CARB/MAGNESIUM 324 MG
81 TABLET ORAL DAILY
Qty: 0 | Refills: 0 | Status: DISCONTINUED | OUTPATIENT
Start: 2017-03-28 | End: 2017-03-29

## 2017-03-28 RX ORDER — ERGOCALCIFEROL 1.25 MG/1
1 CAPSULE ORAL
Qty: 0 | Refills: 0 | COMMUNITY

## 2017-03-28 RX ORDER — APIXABAN 2.5 MG/1
1 TABLET, FILM COATED ORAL
Qty: 0 | Refills: 0 | COMMUNITY

## 2017-03-28 RX ORDER — SENNA PLUS 8.6 MG/1
2 TABLET ORAL
Qty: 60 | Refills: 0 | OUTPATIENT
Start: 2017-03-28 | End: 2017-04-27

## 2017-03-28 RX ORDER — APIXABAN 2.5 MG/1
2.5 TABLET, FILM COATED ORAL
Qty: 0 | Refills: 0 | Status: DISCONTINUED | OUTPATIENT
Start: 2017-03-29 | End: 2017-03-29

## 2017-03-28 RX ORDER — POTASSIUM CHLORIDE 20 MEQ
1 PACKET (EA) ORAL
Qty: 0 | Refills: 0 | COMMUNITY

## 2017-03-28 RX ORDER — PSEUDOEPHEDRINE HCL 30 MG
1 TABLET ORAL
Qty: 0 | Refills: 0 | COMMUNITY

## 2017-03-28 RX ORDER — APIXABAN 2.5 MG/1
1 TABLET, FILM COATED ORAL
Qty: 60 | Refills: 0 | OUTPATIENT
Start: 2017-03-28 | End: 2017-04-27

## 2017-03-28 RX ORDER — DOCUSATE SODIUM 100 MG
1 CAPSULE ORAL
Qty: 0 | Refills: 0 | COMMUNITY
Start: 2017-03-28

## 2017-03-28 RX ORDER — FAMOTIDINE 10 MG/ML
1 INJECTION INTRAVENOUS
Qty: 0 | Refills: 0 | COMMUNITY

## 2017-03-28 RX ADMIN — ENOXAPARIN SODIUM 70 MILLIGRAM(S): 100 INJECTION SUBCUTANEOUS at 12:05

## 2017-03-28 RX ADMIN — Medication 100 MILLIGRAM(S): at 12:05

## 2017-03-28 RX ADMIN — Medication 100 MILLIGRAM(S): at 17:17

## 2017-03-28 NOTE — DISCHARGE NOTE ADULT - PROVIDER TOKENS
FREE:[LAST:[Alo],FIRST:[Lenoard],PHONE:[(   )    -],FAX:[(   )    -]] FREE:[LAST:[ELEAZAR],FIRST:[JEFFREY],PHONE:[phone]],FAX:[(   )    -],ADDRESS:[PCP HOUSE CALL]]

## 2017-03-28 NOTE — DISCHARGE NOTE ADULT - MEDICATION SUMMARY - MEDICATIONS TO STOP TAKING
I will STOP taking the medications listed below when I get home from the hospital:    potassium chloride 8 mEq oral tablet, extended release  -- 1 tab(s) by mouth once a day    famotidine 40 mg oral tablet  -- 1 tab(s) by mouth once a day    Sudafed 60 mg oral tablet  -- 1 tab(s) by mouth 2 times a day

## 2017-03-28 NOTE — DISCHARGE NOTE ADULT - ADDITIONAL INSTRUCTIONS
Vitamin D deficiency- you were taking vitamin D supplement at home. Your vitamin D level was not checked in the hospital. Follow-up with your primary care doctor to have your level checked and decided if you still need this dose of the supplement. Vitamin D deficiency- you were taking vitamin D supplement at home. Your vitamin D level was not checked in the hospital. Follow-up with your primary care doctor to have your level checked and decided if you still need this dose of the supplement.  If clinical worsening oc clinical status consider Palliative consult/ Hospice care outpatient; DNR status

## 2017-03-28 NOTE — DISCHARGE NOTE ADULT - PLAN OF CARE
improvement of nutritional status You were admitted to the hospital due to decrease in appetite and dehydration. You were treated with IV fluids to control sodium levels and prevent dehydration resolution to control thyroid hormone levels to Atrial fibrillation is an irregular heart rhythm which can predispose you to have a stroke. A CAT scan of your head was performed which showed no new stroke but did show evidence of an old stroke. Continue to take eliquis 2.5 mg twice a day as prescribed. This is a blood thinner which will help to prevent stroke. Also, you were started on aspirin. Your lipitor dose was decreased from 20mg each day to 10mg each day. Continue to take this medicaiton. Follow-up with your primary care doctor. to prevent kidney injury and control electrolytes Your sodium levels were high due to dehydration. You were treated with IV fluids. Continue to take in fluids to stay hydrated. Follow-up with your primary care doctor. You had a urinary tract infection. This may have contributed to your change in mental status. You completed a 7 day course of antibiotics. Follow-up with your primary care doctor. Continue to take synthroid as prescribed. Follow-up with your primary care doctor. You had kidney injury due to dehydration. Stay well hydrated by drinking plenty of fluids. You were admitted to the hospital due to decrease in appetite and dehydration. You were treated with IV fluids. After treatment of your dehydration and urinary tract infection your appetite seems to have improved. Your appetite may wax and wane throughout the course of the day due to your dementia. to prevent stroke Atrial fibrillation is an irregular heart rhythm which can predispose you to have a stroke. A CAT scan of your head was performed which showed no new stroke but did show evidence of an old stroke. Continue to take eliquis 2.5 mg twice a day as prescribed. This is a blood thinner which will help to reduce of stroke. Also, you were started on aspirin. Your lipitor dose was decreased from 20mg each day to 10mg each day. Continue to take this medicaiton. Follow-up with your primary care doctor. You were admitted to the hospital due to decrease in appetite and dehydration. You were treated with IV fluids. After treatment of your dehydration and urinary tract infection your appetite seems to have improved. Your appetite may wax and wane throughout the course of the day due to your dementia. Follow-up with your primary care doctor. to reduce risk of stroke Due to leukemia your WBC, cells in the body which fight infection, are high. This high number of cells can cause a release of proteins and other substances which can harm your kidneys. You were started on allopurinol, a medication to help protect your kidneys. to prevent kidney injury After treatment of your dehydration and urinary tract infection your appetite seems to have improved. Your appetite may wax and wane throughout the course of the day due to your dementia. Follow-up with your primary care doctor. You had kidney injury due to dehydration and possibly a condition called tumor lysis (this is described below). Stay well hydrated by drinking plenty of fluids. You were admitted to the hospital due to decrease in appetite and dehydration. The dehydration caused your blood sodium levels to be high. You were treated with IV fluids and your dehydration resolved. Your mental status has improved as well. Continue to take in fluids to stay hydrated. Follow-up with your primary care doctor. Atrial fibrillation is an irregular heart rhythm which can predispose you to have a stroke. A CAT scan of your head was performed which showed no new stroke but did show evidence of an old stroke. Continue to take eliquis 2.5 mg twice a day as prescribed. This is a blood thinner which will help to reduce of stroke. Also, you were started on aspirin 81 mg. Your lipitor dose was decreased from 20mg each day to 10mg each day. Continue to take this medicaiton. Follow-up with your primary care doctor. Due to leukemia (CLL) your WBC, cells in the body which fight infection, are high. This high number of cells can cause a release of proteins and other substances which can harm your kidneys. You were started on allopurinol, a medication to help keep Uric acid levels normal and protect your kidneys. Prevent recurrent infection You had Acute kidney injury due to dehydration and possibly a condition called tumor lysis (this is described below). Stay well hydrated by drinking plenty of fluids.

## 2017-03-28 NOTE — DISCHARGE NOTE ADULT - PATIENT PORTAL LINK FT
“You can access the [facility] Patient Portal, offered by [facility], by registering with the following website: [email or web address]”

## 2017-03-28 NOTE — DISCHARGE NOTE ADULT - HOSPITAL COURSE
89 y/o male from home, walks with walker at baseline, lives with wife and has HHA, with PMHx of Aortic Valve Replacement (Surgery done 9 months ago) and Hypothyroidism, ?Afib as on eliquis, PSH of Rt knee replacement, presents to the ED BIB son for failure to thrive. As per son, for the past 2 weeks pt has stopped eating, taking his medications, spitting out water, refusing food, not walking, and has become nonverbal, was AAOX3 and verbal at baseline. Pt is currently on Eliquis and Gabapentin. ROS limited given that pt is nonverbal. Son denies recent falls, fevers, and chills, SOB, CP, Abd pain, dysuria, n/v/d.  In ED pt has WBC of 39.6 with lymphocyte predominance, bp in 90s, sodium 156, bicarb 18.    Patient was admitted and started on IV fluids for dehydration. UA was positive and urine culture was positive for E. Coli for which patient was treated with  day course of rocephin. Blood cultures were negative x 2. CXR demonstrated no consolidation. CT head demonstrated chronic lacunar infarcts in the bilateral basal ganglia, but no acute intracranial hemorrhage, mass effect or midline shift. Due to NICOLE patient's eliquis was held and patient was started on renally dosed lovenox. On discharge he will be restarted on his home eliquis.    Patient is medically stable for discharge. 89 y/o male from home, walks with walker at baseline, lives with wife and has HHA, with PMHx of Aortic Valve Replacement (Surgery done 9 months ago) and Hypothyroidism, ?Afib as on eliquis, PSH of Rt knee replacement, presents to the ED BIB son for failure to thrive. As per son, for the past 2 weeks pt has stopped eating, taking his medications, spitting out water, refusing food, not walking, and has become nonverbal, was AAOX3 and verbal at baseline. Pt is currently on Eliquis and Gabapentin. ROS limited given that pt is nonverbal. Son denies recent falls, fevers, and chills, SOB, CP, Abd pain, dysuria, n/v/d.  In ED pt has WBC of 39.6 with lymphocyte predominance, bp in 90s, sodium 156, bicarb 18.    Patient was found to be hypernatremic to 159 and started on IV fluids for dehydration. UA was positive and urine culture was positive for E. Coli for which patient was treated with 7 day course of rocephin. Blood cultures were negative x 2. CXR demonstrated no consolidation. CT head demonstrated chronic lacunar infarcts in the bilateral basal ganglia, but no acute intracranial hemorrhage, mass effect or midline shift. Due to NICOLE patient's eliquis was held and patient was started on renally dosed lovenox. Patient likely had a CVA a few weeks back which caused change in mental status and poor PO intake resulting in dehydration. Dehydration likely resulted in NICOLE as well as urinary stasis resulting in UTI. On discharge he was be restarted on his home eliquis. Due to prior stroke patient was started on aspirin. His atorvastatin dose was decreased from 20mg daily to 10mg daily. Patient was also started on allopurinol for concern for possible tumor lysis which may have attributed to NICOLE as well.     Patient is medically stable for discharge. 89 y/o male from home, walks with walker at baseline, lives with wife and has HHA, with PMHx of Aortic Valve Replacement (Surgery done 9 months ago) and Hypothyroidism, ?Afib as on eliquis, PSH of Rt knee replacement, presents to the ED BIB son for failure to thrive. As per son, for the past 2 weeks pt has stopped eating, taking his medications, spitting out water, refusing food, not walking, and has become nonverbal, was AAOX3 and verbal at baseline. Pt is currently on Eliquis and Gabapentin. ROS limited given that pt is nonverbal. Son denies recent falls, fevers, and chills, SOB, CP, Abd pain, dysuria, n/v/d.  In ED pt has WBC of 39.6 with lymphocyte predominance, bp in 90s, sodium 156, bicarb 18.    Patient was found to be hypernatremic to 159 and started on IV fluids for dehydration. UA was positive and urine culture was positive for E. Coli for which patient was treated with 7 day course of rocephin. Blood cultures were negative x 2. CXR demonstrated no consolidation. CT head demonstrated chronic lacunar infarcts in the bilateral basal ganglia, but no acute intracranial hemorrhage, mass effect or midline shift.     Due to NICOLE patient's eliquis was held and patient was started on renally dosed lovenox. Patient likely had a CVA a few weeks back which caused change in mental status and poor PO intake resulting in dehydration. You was placed back on Apixaban once renal function reached baseline.     Dehydration likely resulted in NICOLE as well as urinary stasis resulting in UTI. On discharge he was be restarted on his home eliquis. Due to prior stroke patient was started on aspirin. His atorvastatin dose was decreased from 20mg daily to 10mg daily. Patient was also started on allopurinol for concern for possible tumor lysis which may have attributed to NICOLE as well.     Goals of care discussion with family; Advance directives discussed. DNR status.    Patient clinically improved with regaining of baseline mental status. Patient is medically stable for discharge.

## 2017-03-28 NOTE — PHYSICAL THERAPY INITIAL EVALUATION ADULT - GENERAL OBSERVATIONS, REHAB EVAL
pt available caregiver bedside, [facility], has 24/7 homecare services, baseline assist rw transfers and bsd amb to personal wheelchair

## 2017-03-28 NOTE — DISCHARGE NOTE ADULT - SECONDARY DIAGNOSIS.
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

## 2017-03-28 NOTE — DISCHARGE NOTE ADULT - MEDICATION SUMMARY - MEDICATIONS TO CHANGE
I will SWITCH the dose or number of times a day I take the medications listed below when I get home from the hospital:    atorvastatin 20 mg oral tablet  -- 1 tab(s) by mouth once a day

## 2017-03-28 NOTE — DISCHARGE NOTE ADULT - CARE PLAN
Principal Discharge DX:	Failure to thrive in adult  Goal:	improvement of nutritional status  Instructions for follow-up, activity and diet:	You were admitted to the hospital due to decrease in appetite and dehydration. You were treated with IV fluids  Secondary Diagnosis:	Atrial fibrillation, unspecified type  Goal:	to  Secondary Diagnosis:	Acute renal failure, unspecified acute renal failure type  Secondary Diagnosis:	Hypernatremia  Goal:	to control sodium levels and prevent dehydration  Secondary Diagnosis:	UTI (urinary tract infection)  Goal:	resolution  Secondary Diagnosis:	Hypothyroid  Goal:	to control thyroid hormone levels Principal Discharge DX:	Failure to thrive in adult  Goal:	improvement of nutritional status  Instructions for follow-up, activity and diet:	You were admitted to the hospital due to decrease in appetite and dehydration. You were treated with IV fluids. After treatment of your dehydration and urinary tract infection your appetite seems to have improved. Your appetite may wax and wane throughout the course of the day due to your dementia.  Secondary Diagnosis:	Atrial fibrillation, unspecified type  Goal:	to prevent stroke  Instructions for follow-up, activity and diet:	Atrial fibrillation is an irregular heart rhythm which can predispose you to have a stroke. A CAT scan of your head was performed which showed no new stroke but did show evidence of an old stroke. Continue to take eliquis 2.5 mg twice a day as prescribed. This is a blood thinner which will help to prevent stroke. Also, you were started on aspirin. Your lipitor dose was decreased from 20mg each day to 10mg each day. Continue to take this medicaiton. Follow-up with your primary care doctor.  Secondary Diagnosis:	Acute renal failure, unspecified acute renal failure type  Goal:	to prevent kidney injury and control electrolytes  Instructions for follow-up, activity and diet:	You had kidney injury due to dehydration. Stay well hydrated by drinking plenty of fluids.  Secondary Diagnosis:	Hypernatremia  Goal:	to control sodium levels and prevent dehydration  Instructions for follow-up, activity and diet:	Your sodium levels were high due to dehydration. You were treated with IV fluids. Continue to take in fluids to stay hydrated. Follow-up with your primary care doctor.  Secondary Diagnosis:	UTI (urinary tract infection)  Goal:	resolution  Instructions for follow-up, activity and diet:	You had a urinary tract infection. This may have contributed to your change in mental status. You completed a 7 day course of antibiotics. Follow-up with your primary care doctor.  Secondary Diagnosis:	Hypothyroid  Goal:	to control thyroid hormone levels  Instructions for follow-up, activity and diet:	Continue to take synthroid as prescribed. Follow-up with your primary care doctor. Principal Discharge DX:	Failure to thrive in adult  Goal:	improvement of nutritional status  Instructions for follow-up, activity and diet:	You were admitted to the hospital due to decrease in appetite and dehydration. You were treated with IV fluids. After treatment of your dehydration and urinary tract infection your appetite seems to have improved. Your appetite may wax and wane throughout the course of the day due to your dementia. Follow-up with your primary care doctor.  Secondary Diagnosis:	Atrial fibrillation, unspecified type  Goal:	to reduce risk of stroke  Instructions for follow-up, activity and diet:	Atrial fibrillation is an irregular heart rhythm which can predispose you to have a stroke. A CAT scan of your head was performed which showed no new stroke but did show evidence of an old stroke. Continue to take eliquis 2.5 mg twice a day as prescribed. This is a blood thinner which will help to reduce of stroke. Also, you were started on aspirin. Your lipitor dose was decreased from 20mg each day to 10mg each day. Continue to take this medicaiton. Follow-up with your primary care doctor.  Secondary Diagnosis:	Acute renal failure, unspecified acute renal failure type  Goal:	to prevent kidney injury and control electrolytes  Instructions for follow-up, activity and diet:	You had kidney injury due to dehydration. Stay well hydrated by drinking plenty of fluids.  Secondary Diagnosis:	Hypernatremia  Goal:	to control sodium levels and prevent dehydration  Instructions for follow-up, activity and diet:	Your sodium levels were high due to dehydration. You were treated with IV fluids. Continue to take in fluids to stay hydrated. Follow-up with your primary care doctor.  Secondary Diagnosis:	UTI (urinary tract infection)  Goal:	resolution  Instructions for follow-up, activity and diet:	You had a urinary tract infection. This may have contributed to your change in mental status. You completed a 7 day course of antibiotics. Follow-up with your primary care doctor.  Secondary Diagnosis:	Hypothyroid  Goal:	to control thyroid hormone levels  Instructions for follow-up, activity and diet:	Continue to take synthroid as prescribed. Follow-up with your primary care doctor. Principal Discharge DX:	Hypernatremia  Goal:	to control sodium levels and prevent dehydration  Instructions for follow-up, activity and diet:	You were admitted to the hospital due to decrease in appetite and dehydration. The dehydration caused your blood sodium levels to be high. You were treated with IV fluids and your dehydration resolved. Your mental status has improved as well. Continue to take in fluids to stay hydrated. Follow-up with your primary care doctor.  Secondary Diagnosis:	Atrial fibrillation, unspecified type  Goal:	to reduce risk of stroke  Instructions for follow-up, activity and diet:	Atrial fibrillation is an irregular heart rhythm which can predispose you to have a stroke. A CAT scan of your head was performed which showed no new stroke but did show evidence of an old stroke. Continue to take eliquis 2.5 mg twice a day as prescribed. This is a blood thinner which will help to reduce of stroke. Also, you were started on aspirin. Your lipitor dose was decreased from 20mg each day to 10mg each day. Continue to take this medicaiton. Follow-up with your primary care doctor.  Secondary Diagnosis:	Acute renal failure, unspecified acute renal failure type  Goal:	to prevent kidney injury and control electrolytes  Instructions for follow-up, activity and diet:	You had kidney injury due to dehydration and possibly a condition called tumor lysis (this is described below). Stay well hydrated by drinking plenty of fluids.  Secondary Diagnosis:	UTI (urinary tract infection)  Goal:	resolution  Instructions for follow-up, activity and diet:	You had a urinary tract infection. This may have contributed to your change in mental status. You completed a 7 day course of antibiotics. Follow-up with your primary care doctor.  Secondary Diagnosis:	Hypothyroid  Goal:	to control thyroid hormone levels  Instructions for follow-up, activity and diet:	Continue to take synthroid as prescribed. Follow-up with your primary care doctor.  Secondary Diagnosis:	Failure to thrive in adult  Goal:	improvement of nutritional status  Instructions for follow-up, activity and diet:	After treatment of your dehydration and urinary tract infection your appetite seems to have improved. Your appetite may wax and wane throughout the course of the day due to your dementia. Follow-up with your primary care doctor.  Secondary Diagnosis:	Tumor lysis syndrome  Goal:	to prevent kidney injury  Instructions for follow-up, activity and diet:	Due to leukemia your WBC, cells in the body which fight infection, are high. This high number of cells can cause a release of proteins and other substances which can harm your kidneys. You were started on allopurinol, a medication to help protect your kidneys. Principal Discharge DX:	Acute hypernatremia  Goal:	to control sodium levels and prevent dehydration  Instructions for follow-up, activity and diet:	You were admitted to the hospital due to decrease in appetite and dehydration. The dehydration caused your blood sodium levels to be high. You were treated with IV fluids and your dehydration resolved. Your mental status has improved as well. Continue to take in fluids to stay hydrated. Follow-up with your primary care doctor.  Secondary Diagnosis:	Atrial fibrillation, unspecified type  Goal:	to reduce risk of stroke  Instructions for follow-up, activity and diet:	Atrial fibrillation is an irregular heart rhythm which can predispose you to have a stroke. A CAT scan of your head was performed which showed no new stroke but did show evidence of an old stroke. Continue to take eliquis 2.5 mg twice a day as prescribed. This is a blood thinner which will help to reduce of stroke. Also, you were started on aspirin 81 mg. Your lipitor dose was decreased from 20mg each day to 10mg each day. Continue to take this medicaiton. Follow-up with your primary care doctor.  Secondary Diagnosis:	Acute renal failure, unspecified acute renal failure type  Goal:	to prevent kidney injury and control electrolytes  Instructions for follow-up, activity and diet:	You had Acute kidney injury due to dehydration and possibly a condition called tumor lysis (this is described below). Stay well hydrated by drinking plenty of fluids.  Secondary Diagnosis:	UTI (urinary tract infection)  Goal:	Prevent recurrent infection  Instructions for follow-up, activity and diet:	You had a urinary tract infection. This may have contributed to your change in mental status. You completed a 7 day course of antibiotics. Follow-up with your primary care doctor.  Secondary Diagnosis:	Hypothyroid  Goal:	to control thyroid hormone levels  Instructions for follow-up, activity and diet:	Continue to take synthroid as prescribed. Follow-up with your primary care doctor.  Secondary Diagnosis:	Failure to thrive in adult  Goal:	improvement of nutritional status  Instructions for follow-up, activity and diet:	After treatment of your dehydration and urinary tract infection your appetite seems to have improved. Your appetite may wax and wane throughout the course of the day due to your dementia. Follow-up with your primary care doctor.  Secondary Diagnosis:	Tumor lysis syndrome  Goal:	to prevent kidney injury  Instructions for follow-up, activity and diet:	Due to leukemia (CLL) your WBC, cells in the body which fight infection, are high. This high number of cells can cause a release of proteins and other substances which can harm your kidneys. You were started on allopurinol, a medication to help keep Uric acid levels normal and protect your kidneys.

## 2017-03-28 NOTE — DISCHARGE NOTE ADULT - CARE PROVIDER_API CALL
Leonard Prajapati  Phone: (   )    -  Fax: (   )    - JEFFREY BUSH  PCP HOUSE CALL  Phone: [phone]  Fax: (   )    -

## 2017-03-28 NOTE — DISCHARGE NOTE ADULT - MEDICATION SUMMARY - MEDICATIONS TO TAKE
I will START or STAY ON the medications listed below when I get home from the hospital:    aspirin 81 mg oral tablet, chewable  -- 1 tab(s) by mouth once a day  -- Indication: For History of stroke    Eliquis 2.5 mg oral tablet  -- 1 tab(s) by mouth 2 times a day  -- Indication: For Atrial fibrillation, unspecified type    gabapentin 100 mg oral tablet  --  by mouth 2 times a day  -- Indication: For pain    allopurinol 100 mg oral tablet  -- 1 tab(s) by mouth once a day  -- Indication: For Tumor lysis syndrome, prevent kidney injury    atorvastatin 10 mg oral tablet  -- 1 tab(s) by mouth once a day  -- Indication: For History of stroke    docusate sodium 100 mg oral capsule  -- 1 cap(s) by mouth 2 times a day  -- Indication: For constipation    senna oral tablet  -- 2 tab(s) by mouth once a day (at bedtime)  -- Indication: For constipation    levothyroxine 75 mcg (0.075 mg) oral tablet  -- 1 tab(s) by mouth once a day  -- Indication: For Hypothyroid    Drisdol 50,000 intl units (1.25 mg) oral capsule  -- 1 cap(s) by mouth once a week  -- Indication: For vitamin D deficiency

## 2017-03-29 VITALS
RESPIRATION RATE: 17 BRPM | TEMPERATURE: 98 F | HEART RATE: 60 BPM | OXYGEN SATURATION: 96 % | WEIGHT: 143.96 LBS | DIASTOLIC BLOOD PRESSURE: 63 MMHG | SYSTOLIC BLOOD PRESSURE: 103 MMHG

## 2017-03-29 LAB
ANION GAP SERPL CALC-SCNC: 11 MMOL/L — SIGNIFICANT CHANGE UP (ref 5–17)
BUN SERPL-MCNC: 23 MG/DL — HIGH (ref 7–18)
CALCIUM SERPL-MCNC: 8.2 MG/DL — LOW (ref 8.4–10.5)
CHLORIDE SERPL-SCNC: 110 MMOL/L — HIGH (ref 96–108)
CO2 SERPL-SCNC: 21 MMOL/L — LOW (ref 22–31)
CREAT SERPL-MCNC: 1.6 MG/DL — HIGH (ref 0.5–1.3)
GLUCOSE SERPL-MCNC: 106 MG/DL — HIGH (ref 70–99)
POTASSIUM SERPL-MCNC: 3.9 MMOL/L — SIGNIFICANT CHANGE UP (ref 3.5–5.3)
POTASSIUM SERPL-SCNC: 3.9 MMOL/L — SIGNIFICANT CHANGE UP (ref 3.5–5.3)
SODIUM SERPL-SCNC: 142 MMOL/L — SIGNIFICANT CHANGE UP (ref 135–145)

## 2017-03-29 PROCEDURE — 84550 ASSAY OF BLOOD/URIC ACID: CPT

## 2017-03-29 PROCEDURE — 84300 ASSAY OF URINE SODIUM: CPT

## 2017-03-29 PROCEDURE — 87205 SMEAR GRAM STAIN: CPT

## 2017-03-29 PROCEDURE — 99239 HOSP IP/OBS DSCHRG MGMT >30: CPT

## 2017-03-29 PROCEDURE — 85730 THROMBOPLASTIN TIME PARTIAL: CPT

## 2017-03-29 PROCEDURE — 80061 LIPID PANEL: CPT

## 2017-03-29 PROCEDURE — 83615 LACTATE (LD) (LDH) ENZYME: CPT

## 2017-03-29 PROCEDURE — 85045 AUTOMATED RETICULOCYTE COUNT: CPT

## 2017-03-29 PROCEDURE — 83036 HEMOGLOBIN GLYCOSYLATED A1C: CPT

## 2017-03-29 PROCEDURE — 87086 URINE CULTURE/COLONY COUNT: CPT

## 2017-03-29 PROCEDURE — 82436 ASSAY OF URINE CHLORIDE: CPT

## 2017-03-29 PROCEDURE — 84443 ASSAY THYROID STIM HORMONE: CPT

## 2017-03-29 PROCEDURE — 84560 ASSAY OF URINE/URIC ACID: CPT

## 2017-03-29 PROCEDURE — 87186 SC STD MICRODIL/AGAR DIL: CPT

## 2017-03-29 PROCEDURE — 82607 VITAMIN B-12: CPT

## 2017-03-29 PROCEDURE — 84100 ASSAY OF PHOSPHORUS: CPT

## 2017-03-29 PROCEDURE — 99285 EMERGENCY DEPT VISIT HI MDM: CPT | Mod: 25

## 2017-03-29 PROCEDURE — 83735 ASSAY OF MAGNESIUM: CPT

## 2017-03-29 PROCEDURE — 85027 COMPLETE CBC AUTOMATED: CPT

## 2017-03-29 PROCEDURE — 87040 BLOOD CULTURE FOR BACTERIA: CPT

## 2017-03-29 PROCEDURE — 83550 IRON BINDING TEST: CPT

## 2017-03-29 PROCEDURE — 83010 ASSAY OF HAPTOGLOBIN QUANT: CPT

## 2017-03-29 PROCEDURE — 84540 ASSAY OF URINE/UREA-N: CPT

## 2017-03-29 PROCEDURE — 71045 X-RAY EXAM CHEST 1 VIEW: CPT

## 2017-03-29 PROCEDURE — 83935 ASSAY OF URINE OSMOLALITY: CPT

## 2017-03-29 PROCEDURE — 85610 PROTHROMBIN TIME: CPT

## 2017-03-29 PROCEDURE — 82570 ASSAY OF URINE CREATININE: CPT

## 2017-03-29 PROCEDURE — 80048 BASIC METABOLIC PNL TOTAL CA: CPT

## 2017-03-29 PROCEDURE — 83690 ASSAY OF LIPASE: CPT

## 2017-03-29 PROCEDURE — 93005 ELECTROCARDIOGRAM TRACING: CPT

## 2017-03-29 PROCEDURE — 81001 URINALYSIS AUTO W/SCOPE: CPT

## 2017-03-29 PROCEDURE — 80053 COMPREHEN METABOLIC PANEL: CPT

## 2017-03-29 PROCEDURE — 82746 ASSAY OF FOLIC ACID SERUM: CPT

## 2017-03-29 PROCEDURE — 82728 ASSAY OF FERRITIN: CPT

## 2017-03-29 PROCEDURE — 92610 EVALUATE SWALLOWING FUNCTION: CPT

## 2017-03-29 PROCEDURE — 84133 ASSAY OF URINE POTASSIUM: CPT

## 2017-03-29 PROCEDURE — 84484 ASSAY OF TROPONIN QUANT: CPT

## 2017-03-29 PROCEDURE — 83605 ASSAY OF LACTIC ACID: CPT

## 2017-03-29 PROCEDURE — 86160 COMPLEMENT ANTIGEN: CPT

## 2017-03-29 PROCEDURE — 86301 IMMUNOASSAY TUMOR CA 19-9: CPT

## 2017-03-29 PROCEDURE — 70450 CT HEAD/BRAIN W/O DYE: CPT

## 2017-03-29 PROCEDURE — 82550 ASSAY OF CK (CPK): CPT

## 2017-03-29 PROCEDURE — 82803 BLOOD GASES ANY COMBINATION: CPT

## 2017-03-29 PROCEDURE — 97162 PT EVAL MOD COMPLEX 30 MIN: CPT

## 2017-03-29 RX ORDER — ERGOCALCIFEROL 1.25 MG/1
1 CAPSULE ORAL
Qty: 4 | Refills: 0 | OUTPATIENT
Start: 2017-03-29 | End: 2017-04-28

## 2017-03-29 RX ADMIN — Medication 75 MICROGRAM(S): at 06:33

## 2017-03-29 RX ADMIN — APIXABAN 2.5 MILLIGRAM(S): 2.5 TABLET, FILM COATED ORAL at 06:33

## 2017-03-31 DIAGNOSIS — I48.91 UNSPECIFIED ATRIAL FIBRILLATION: ICD-10-CM

## 2017-03-31 DIAGNOSIS — N17.0 ACUTE KIDNEY FAILURE WITH TUBULAR NECROSIS: ICD-10-CM

## 2017-03-31 DIAGNOSIS — E43 UNSPECIFIED SEVERE PROTEIN-CALORIE MALNUTRITION: ICD-10-CM

## 2017-03-31 DIAGNOSIS — Z28.21 IMMUNIZATION NOT CARRIED OUT BECAUSE OF PATIENT REFUSAL: ICD-10-CM

## 2017-03-31 DIAGNOSIS — E55.9 VITAMIN D DEFICIENCY, UNSPECIFIED: ICD-10-CM

## 2017-03-31 DIAGNOSIS — R62.7 ADULT FAILURE TO THRIVE: ICD-10-CM

## 2017-03-31 DIAGNOSIS — N18.3 CHRONIC KIDNEY DISEASE, STAGE 3 (MODERATE): ICD-10-CM

## 2017-03-31 DIAGNOSIS — E88.3 TUMOR LYSIS SYNDROME: ICD-10-CM

## 2017-03-31 DIAGNOSIS — E87.2 ACIDOSIS: ICD-10-CM

## 2017-03-31 DIAGNOSIS — K59.00 CONSTIPATION, UNSPECIFIED: ICD-10-CM

## 2017-03-31 DIAGNOSIS — Z96.651 PRESENCE OF RIGHT ARTIFICIAL KNEE JOINT: ICD-10-CM

## 2017-03-31 DIAGNOSIS — I63.9 CEREBRAL INFARCTION, UNSPECIFIED: ICD-10-CM

## 2017-03-31 DIAGNOSIS — C91.10 CHRONIC LYMPHOCYTIC LEUKEMIA OF B-CELL TYPE NOT HAVING ACHIEVED REMISSION: ICD-10-CM

## 2017-03-31 DIAGNOSIS — Z51.5 ENCOUNTER FOR PALLIATIVE CARE: ICD-10-CM

## 2017-03-31 DIAGNOSIS — Z79.01 LONG TERM (CURRENT) USE OF ANTICOAGULANTS: ICD-10-CM

## 2017-03-31 DIAGNOSIS — D72.829 ELEVATED WHITE BLOOD CELL COUNT, UNSPECIFIED: ICD-10-CM

## 2017-03-31 DIAGNOSIS — R13.10 DYSPHAGIA, UNSPECIFIED: ICD-10-CM

## 2017-03-31 DIAGNOSIS — E87.0 HYPEROSMOLALITY AND HYPERNATREMIA: ICD-10-CM

## 2017-03-31 DIAGNOSIS — N39.0 URINARY TRACT INFECTION, SITE NOT SPECIFIED: ICD-10-CM

## 2017-03-31 DIAGNOSIS — F03.90 UNSPECIFIED DEMENTIA, UNSPECIFIED SEVERITY, WITHOUT BEHAVIORAL DISTURBANCE, PSYCHOTIC DISTURBANCE, MOOD DISTURBANCE, AND ANXIETY: ICD-10-CM

## 2017-03-31 DIAGNOSIS — Z95.4 PRESENCE OF OTHER HEART-VALVE REPLACEMENT: ICD-10-CM

## 2017-03-31 DIAGNOSIS — B96.20 UNSPECIFIED ESCHERICHIA COLI [E. COLI] AS THE CAUSE OF DISEASES CLASSIFIED ELSEWHERE: ICD-10-CM

## 2017-03-31 DIAGNOSIS — D64.9 ANEMIA, UNSPECIFIED: ICD-10-CM

## 2017-03-31 DIAGNOSIS — E87.5 HYPERKALEMIA: ICD-10-CM

## 2017-03-31 DIAGNOSIS — E86.0 DEHYDRATION: ICD-10-CM

## 2017-03-31 DIAGNOSIS — E03.9 HYPOTHYROIDISM, UNSPECIFIED: ICD-10-CM

## 2018-01-08 NOTE — DIETITIAN INITIAL EVALUATION ADULT. - PROBLEM SELECTOR PLAN 2
Pt has NICOLE with creatinine 3.13, unknown baseline, no known CKD likely prerenal or post renal due to dehydration or concern for tumor lysis syndrome from possible CLL.  f/u urine lytes  f/u CT abd/ pelvis to r/o  malignancy  monitor bmp  f/u uric acid and CK to look for tumor lysis  Called PMD Dr. Casper Wood [phone] and Nephro [phone] for baseline creatinine and any antibiotic prescribed for strep throat, but no answer. Primary team to call in AM again 08-Jan-2018

## 2019-02-04 NOTE — DIETITIAN INITIAL EVALUATION ADULT. - FACTORS AFF FOOD INTAKE
Event Note
difficulty feeding self/change in mental status/difficulty chewing/difficulty with food procurement/preparation/other (specify)/Weakness

## 2022-07-01 NOTE — DIETITIAN INITIAL EVALUATION ADULT. - PROBLEM SELECTOR PLAN 1
Pt is not eating and drinking and became non verbal, low albumin 2.5 likely due to Failure to thrive and AMS from dehydration.  Pt has leukocytosis 39.6 with lymphocyte predominance with smudge cells, rouleaux formation, and giant platelets on peripheral smear concerning for Chronic Lymphocytic Leukemia  with concern for tumor lysis syndrome given NICOLE which could lead to FTT.  Pt has acidosis with bicarb 18, likely metabolic acidosis from dehydration, will f/u lactate and ABG  S/P ns 2 LT BOLUS, will give D5W and 0.45% NS 2 60 as hypernatremia 156.  f/u speech and swallow eval  f/u Ca 19-9, and Anemia panel  Hem onco Dr. Schuster consulted
room air